# Patient Record
Sex: MALE | Race: OTHER | Employment: OTHER | URBAN - METROPOLITAN AREA
[De-identification: names, ages, dates, MRNs, and addresses within clinical notes are randomized per-mention and may not be internally consistent; named-entity substitution may affect disease eponyms.]

---

## 2019-06-18 NOTE — PATIENT DISCUSSION
New Prescription: Lotemax (loteprednol etabonate): ointment: 0.5% 1 a small amount twice a day on eyelid 06-

## 2019-06-18 NOTE — PATIENT DISCUSSION
New Prescription: Pred Forte (prednisolone acetate): drops,suspension: 1% 1 drop four times a day into both eyes 06-

## 2019-06-18 NOTE — PATIENT DISCUSSION
GIANT PAPILLARY CONJUNCTIVITIS (ALLERGIC), OU:  PRESCRIBE PRED FORTE QID OU AND PAZEO QAM OU. COOL COMPRESSES BID/PRN. RETURN FOR FOLLOW-UP AS SCHEDULED/SOONER IF SYMPTOMS INCREASE/PERSIST.

## 2019-06-18 NOTE — PATIENT DISCUSSION
ALLERGIC DERMATITIS OU:  PRESCRIBED Dasie Ashley. APPLY TO AFFECTED AREA TWICE DAILY. RECOMMENDED COOL COMPRESSES.

## 2022-03-08 ENCOUNTER — NEW PATIENT (OUTPATIENT)
Dept: URBAN - METROPOLITAN AREA CLINIC 4 | Facility: CLINIC | Age: 62
End: 2022-03-08

## 2022-03-08 DIAGNOSIS — H25.13: ICD-10-CM

## 2022-03-08 DIAGNOSIS — H47.012: ICD-10-CM

## 2022-03-08 DIAGNOSIS — H40.013: ICD-10-CM

## 2022-03-08 DIAGNOSIS — H35.032: ICD-10-CM

## 2022-03-08 DIAGNOSIS — H40.1231: ICD-10-CM

## 2022-03-08 PROCEDURE — 99204 OFFICE O/P NEW MOD 45 MIN: CPT

## 2022-03-08 PROCEDURE — 92133 CPTRZD OPH DX IMG PST SGM ON: CPT

## 2022-03-08 PROCEDURE — 76514 ECHO EXAM OF EYE THICKNESS: CPT

## 2022-03-08 RX ORDER — BRIMONIDINE TARTRATE 1 MG/ML
1 SOLUTION/ DROPS OPHTHALMIC
Start: 2022-03-08

## 2022-03-08 ASSESSMENT — TONOMETRY
OD_IOP_MMHG: 20
OS_IOP_MMHG: 18

## 2022-03-08 ASSESSMENT — KERATOMETRY
OD_AXISANGLE2_DEGREES: 6
OD_K2POWER_DIOPTERS: 44.50
OD_K1POWER_DIOPTERS: 43.75
OD_AXISANGLE_DEGREES: 96
OS_K2POWER_DIOPTERS: 45.25
OS_AXISANGLE_DEGREES: 60
OS_AXISANGLE2_DEGREES: 150
OS_K1POWER_DIOPTERS: 44.75

## 2022-03-08 ASSESSMENT — PACHYMETRY
OS_CT_UM: 579
OD_CT_UM: 615

## 2022-03-08 ASSESSMENT — VISUAL ACUITY
OD_SC: 20/30
OS_SC: 20/200

## 2022-04-12 ENCOUNTER — ESTABLISHED PATIENT (OUTPATIENT)
Dept: URBAN - METROPOLITAN AREA CLINIC 4 | Facility: CLINIC | Age: 62
End: 2022-04-12

## 2022-04-12 DIAGNOSIS — H40.013: ICD-10-CM

## 2022-04-12 DIAGNOSIS — H47.012: ICD-10-CM

## 2022-04-12 DIAGNOSIS — H40.1231: ICD-10-CM

## 2022-04-12 DIAGNOSIS — H25.13: ICD-10-CM

## 2022-04-12 DIAGNOSIS — H35.032: ICD-10-CM

## 2022-04-12 PROCEDURE — 99214 OFFICE O/P EST MOD 30 MIN: CPT

## 2022-04-12 PROCEDURE — 92083 EXTENDED VISUAL FIELD XM: CPT

## 2022-04-12 ASSESSMENT — TONOMETRY
OS_IOP_MMHG: 15
OD_IOP_MMHG: 15

## 2022-04-12 ASSESSMENT — VISUAL ACUITY
OS_SC: 20/200
OS_CC: 20/400
OD_CC: J7
OD_SC: 20/30

## 2022-04-12 ASSESSMENT — KERATOMETRY
OS_K1POWER_DIOPTERS: 44.75
OS_AXISANGLE_DEGREES: 60
OD_AXISANGLE_DEGREES: 96
OD_AXISANGLE2_DEGREES: 6
OD_K1POWER_DIOPTERS: 43.75
OD_K2POWER_DIOPTERS: 44.50
OS_AXISANGLE2_DEGREES: 150
OS_K2POWER_DIOPTERS: 45.25

## 2022-04-12 NOTE — PATIENT DISCUSSION
Reviewed VF with patient, initial test. 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

## 2022-05-05 ENCOUNTER — EMERGENCY VISIT (OUTPATIENT)
Dept: URBAN - METROPOLITAN AREA CLINIC 5 | Facility: CLINIC | Age: 62
End: 2022-05-05

## 2022-05-05 DIAGNOSIS — H35.032: ICD-10-CM

## 2022-05-05 DIAGNOSIS — H47.012: ICD-10-CM

## 2022-05-05 DIAGNOSIS — H40.021: ICD-10-CM

## 2022-05-05 DIAGNOSIS — H40.1233: ICD-10-CM

## 2022-05-05 DIAGNOSIS — H25.13: ICD-10-CM

## 2022-05-05 PROCEDURE — 92134 CPTRZ OPH DX IMG PST SGM RTA: CPT

## 2022-05-05 PROCEDURE — 99214 OFFICE O/P EST MOD 30 MIN: CPT

## 2022-05-05 ASSESSMENT — VISUAL ACUITY
OS_SC: 20/400
OD_SC: 20/30-1

## 2022-05-05 ASSESSMENT — TONOMETRY
OD_IOP_MMHG: 12
OS_IOP_MMHG: 12

## 2022-05-05 NOTE — PATIENT DISCUSSION
He does have an area of pallor but no real risk except for FH and now issues OS.  Highest IOP was in the 20's.

## 2022-05-05 NOTE — PATIENT DISCUSSION
It is possible that the temporal pallor and thinning was not recognized as his vision deteriorated in the fall of 2021. Nerve continues to look better than his VF. He has a very dense scotoma into fixation.

## 2022-05-05 NOTE — PATIENT DISCUSSION
Discussed refraction for glasses with optometry. And protective eyewear. He is essential monocular. Recommended Dr. Melanie Whitfield.

## 2022-05-17 ENCOUNTER — ESTABLISHED PATIENT (OUTPATIENT)
Dept: URBAN - METROPOLITAN AREA CLINIC 17 | Facility: CLINIC | Age: 62
End: 2022-05-17

## 2022-05-17 DIAGNOSIS — H52.4: ICD-10-CM

## 2022-05-17 DIAGNOSIS — H25.13: ICD-10-CM

## 2022-05-17 DIAGNOSIS — H40.1233: ICD-10-CM

## 2022-05-17 DIAGNOSIS — H40.021: ICD-10-CM

## 2022-05-17 DIAGNOSIS — H47.012: ICD-10-CM

## 2022-05-17 PROCEDURE — 99199PERE PHYSICIANS EYECARE PLAN EXAM AND REFRACT EST PT

## 2022-05-17 ASSESSMENT — TONOMETRY
OD_IOP_MMHG: 20
OS_IOP_MMHG: 20

## 2022-05-17 ASSESSMENT — VISUAL ACUITY
OS_SC: 20/400
OD_PH: 20/25
OD_SC: 20/50

## 2022-05-17 NOTE — PATIENT DISCUSSION
Discussed refraction for glasses with optometry. And protective eyewear. He is essential monocular. Recommended Dr. Roosevelt Moreno.

## 2022-05-26 ENCOUNTER — ESTABLISHED PATIENT (OUTPATIENT)
Dept: URBAN - METROPOLITAN AREA CLINIC 17 | Facility: CLINIC | Age: 62
End: 2022-05-26

## 2022-05-26 DIAGNOSIS — H52.4: ICD-10-CM

## 2022-05-26 DIAGNOSIS — H25.13: ICD-10-CM

## 2022-05-26 PROCEDURE — 99211NC NO CHARGE VISIT

## 2022-05-26 ASSESSMENT — VISUAL ACUITY
OS_SC: 20/400
OD_SC: 20/20-2

## 2022-05-26 NOTE — PATIENT DISCUSSION
Discussed refraction for glasses with optometry. And protective eyewear. He is essential monocular. Recommended Dr. Regis Lanier.

## 2022-07-05 ENCOUNTER — CONTACT LENSES/GLASSES VISIT (OUTPATIENT)
Dept: URBAN - METROPOLITAN AREA CLINIC 17 | Facility: CLINIC | Age: 62
End: 2022-07-05

## 2022-07-05 DIAGNOSIS — H40.1233: ICD-10-CM

## 2022-07-05 DIAGNOSIS — H52.4: ICD-10-CM

## 2022-07-05 DIAGNOSIS — H25.13: ICD-10-CM

## 2022-07-05 PROCEDURE — 99211NC NO CHARGE VISIT

## 2022-07-05 ASSESSMENT — VISUAL ACUITY
OS_CC: 20/200-1
OD_CC: 20/40+1

## 2022-07-05 ASSESSMENT — TONOMETRY
OS_IOP_MMHG: 18
OD_IOP_MMHG: 18

## 2022-08-25 ENCOUNTER — FOLLOW UP (OUTPATIENT)
Dept: URBAN - METROPOLITAN AREA CLINIC 5 | Facility: CLINIC | Age: 62
End: 2022-08-25

## 2022-08-25 DIAGNOSIS — H25.13: ICD-10-CM

## 2022-08-25 DIAGNOSIS — H40.1233: ICD-10-CM

## 2022-08-25 DIAGNOSIS — H40.021: ICD-10-CM

## 2022-08-25 PROCEDURE — 92083 EXTENDED VISUAL FIELD XM: CPT

## 2022-08-25 PROCEDURE — 99214 OFFICE O/P EST MOD 30 MIN: CPT

## 2022-08-25 ASSESSMENT — VISUAL ACUITY
OD_CC: 20/25
OS_CC: 20/100-1

## 2022-08-25 ASSESSMENT — TONOMETRY
OS_IOP_MMHG: 15
OD_IOP_MMHG: 21

## 2022-08-25 NOTE — PATIENT DISCUSSION
NO SURGERY NO RX: Not visually or functionally significant. Continue with current glasses Rx. Patient to call with changes to vision.

## 2022-08-25 NOTE — PATIENT DISCUSSION
VF WNL. IOP elevated some today. D/C Brimonidine for 2 weeks as he may be getting an allergy. Added Vyzulta samples given in office today.

## 2022-08-25 NOTE — PATIENT DISCUSSION
Recommend routine eye exams with a glaucoma specialist. Moniquejose carlos Keatingce him Dr. Thais Fabry and Dr. Yari smith for scheduling.

## 2022-11-11 ENCOUNTER — FOLLOW UP (OUTPATIENT)
Dept: URBAN - METROPOLITAN AREA CLINIC 15 | Facility: CLINIC | Age: 62
End: 2022-11-11

## 2022-11-11 DIAGNOSIS — H40.1233: ICD-10-CM

## 2022-11-11 DIAGNOSIS — H40.021: ICD-10-CM

## 2022-11-11 PROCEDURE — 99214 OFFICE O/P EST MOD 30 MIN: CPT

## 2022-11-11 ASSESSMENT — VISUAL ACUITY
OD_CC: 20/20
OS_CC: 20/400

## 2022-11-11 ASSESSMENT — TONOMETRY
OS_IOP_MMHG: 18
OD_IOP_MMHG: 20

## 2022-11-11 NOTE — PATIENT DISCUSSION
Patient had been followed for 6-8months being told it was retinal issues in OS. This was actually glaucoma. He saw Dr. Mark Christian who then referred him to me. 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